# Patient Record
Sex: MALE | Race: WHITE | NOT HISPANIC OR LATINO | URBAN - METROPOLITAN AREA
[De-identification: names, ages, dates, MRNs, and addresses within clinical notes are randomized per-mention and may not be internally consistent; named-entity substitution may affect disease eponyms.]

---

## 2022-07-27 LAB — HBA1C MFR BLD HPLC: 5.3 %

## 2022-11-22 ENCOUNTER — OFFICE VISIT (OUTPATIENT)
Dept: INTERNAL MEDICINE CLINIC | Facility: CLINIC | Age: 37
End: 2022-11-22

## 2022-11-22 VITALS
DIASTOLIC BLOOD PRESSURE: 70 MMHG | HEART RATE: 74 BPM | SYSTOLIC BLOOD PRESSURE: 130 MMHG | BODY MASS INDEX: 23.87 KG/M2 | RESPIRATION RATE: 12 BRPM | WEIGHT: 192 LBS | HEIGHT: 75 IN | TEMPERATURE: 98.8 F

## 2022-11-22 DIAGNOSIS — G40.109 LOCALIZATION-RELATED EPILEPSY (HCC): ICD-10-CM

## 2022-11-22 DIAGNOSIS — R90.82 WHITE MATTER ABNORMALITY ON MRI OF BRAIN: ICD-10-CM

## 2022-11-22 DIAGNOSIS — G47.00 HYPOSOMNIA: Primary | ICD-10-CM

## 2022-11-22 DIAGNOSIS — F32.0 CURRENT MILD EPISODE OF MAJOR DEPRESSIVE DISORDER WITHOUT PRIOR EPISODE (HCC): ICD-10-CM

## 2022-11-22 DIAGNOSIS — R53.83 OTHER FATIGUE: ICD-10-CM

## 2022-11-22 DIAGNOSIS — Z11.59 NEED FOR HEPATITIS C SCREENING TEST: ICD-10-CM

## 2022-11-22 DIAGNOSIS — R17 ELEVATED BILIRUBIN: ICD-10-CM

## 2022-11-22 DIAGNOSIS — Z86.16 HISTORY OF COVID-19: ICD-10-CM

## 2022-11-22 DIAGNOSIS — A09 DIARRHEA OF INFECTIOUS ORIGIN: ICD-10-CM

## 2022-11-22 DIAGNOSIS — F43.10 POSTTRAUMATIC STRESS DISORDER: ICD-10-CM

## 2022-11-22 DIAGNOSIS — R74.01 ELEVATED TRANSAMINASE LEVEL: ICD-10-CM

## 2022-11-22 RX ORDER — LACOSAMIDE 150 MG/1
150 TABLET ORAL 2 TIMES DAILY
COMMUNITY
Start: 2022-10-23

## 2022-11-22 RX ORDER — LACOSAMIDE 100 MG/1
100 TABLET ORAL 2 TIMES DAILY
COMMUNITY

## 2022-11-22 NOTE — PROGRESS NOTES
Assessment/Plan:    No problem-specific Assessment & Plan notes found for this encounter  Diagnoses and all orders for this visit:    Hyposomnia  -     TSH, 3rd generation; Future  -     T4, free; Future  -     Home Study; Future  -     ECG 12 lead; Future    Posttraumatic stress disorder  -     Ambulatory Referral to Psychiatry; Future    Localization-related epilepsy [facility])  Comments:  - cont neurology care  -1/2022  +L temp spike eeg    History of COVID-19  Comments:  9/2021 + home test  10/2022 + home test  Orders:  -     ECG 12 lead; Future    Other fatigue    Need for hepatitis C screening test  -     Hepatitis C Antibody ([facility]); Future    Elevated transaminase level  -     Ceruloplasmin; Future  -     DEDRICK 12 Plus Profile, Do All ([facility]); Future  -     Ambulatory Referral to Gastroenterology; Future    Current mild episode of major depressive disorder without prior episode [facility])  Comments:  -jan 2022    Diarrhea of infectious origin  -     Ova and parasite examination; Future  -     Clostridium difficile toxin by PCR; Future  -     White Blood Cells, Stool by Gram Stain; Future  -     Ova and parasite examination  -     Clostridium difficile toxin by PCR    Elevated bilirubin  -     Antimitochondrial antibody; Future  -     Ambulatory Referral to Gastroenterology; Future    White matter abnormality on MRI of brain  -     Lyme Antibody Profile with reflex to WB; Future    Other orders  -     lacosamide (VIMPAT) 100 mg tablet; Take 100 mg by mouth 2 (two) times a day  -     Midazolam 5 MG/0 1ML SOLN; 5 mg into each nostril  -     lacosamide (VIMPAT) 150 mg tablet; Take 150 mg by mouth 2 (two) times a day          Subjective:      Patient ID: Vinayak Edwards is a 40 y o  male      Primary dr Abner Beckwith and gi referral  Neurology=dr hernandez at [facility]    2 inches old records reviewed  On vimpat  And cbd  For sz  1/2022--absence and generalizewd  + fatigue  Lft >400 5/2022- [place] [facility]=[place]  Ct==mild cerebeller tonsil ectopi  Mri=nonspecific white matter changes  + EEG L temp spike  Head trauma [place] 2015-Left head trauma  tbili 1 9  Hep c neg  First time antisz med=5/2022 keppra  Also lamictal rash  Moved out- threw wife during seizure  Hx purple hands  Diarrhea bad may 2022  Vertigo august  Amnesia once  Smell trigger auras  memmory bad  Hr 138  Walking apple watch at times  Long kamara           The following portions of the patient's history were reviewed and updated as appropriate: allergies, current medications, past family history, past medical history, past social history, past surgical history, and problem list     Review of Systems   Constitutional: Negative for activity change, appetite change, chills, diaphoresis, fatigue and fever  HENT: Negative for congestion, facial swelling, hearing loss, mouth sores, rhinorrhea, sore throat, trouble swallowing and voice change  Eyes: Negative for photophobia and pain  Respiratory: Negative for apnea, cough, chest tightness, shortness of breath and stridor  Cardiovascular: Negative for chest pain, palpitations and leg swelling  Gastrointestinal: Negative for abdominal distention, abdominal pain, blood in stool and constipation  Endocrine: Negative for cold intolerance and heat intolerance  Genitourinary: Negative for difficulty urinating, dysuria, flank pain, genital sores, hematuria and urgency  Musculoskeletal: Negative for arthralgias, back pain, gait problem, joint swelling and myalgias  Skin: Negative for rash and wound  Allergic/Immunologic: Negative for environmental allergies, food allergies and immunocompromised state  Neurological: Negative for dizziness, tremors, seizures, syncope, facial asymmetry, speech difficulty, weakness, light-headedness, numbness and headaches  Hematological: Negative for adenopathy  Does not bruise/bleed easily     Psychiatric/Behavioral: Negative for agitation, behavioral problems, hallucinations, self-injury, sleep disturbance and suicidal ideas  Objective:      /70   Pulse 74   Temp 98 8 °F (37 1 °C)   Resp 12   Ht 6' 3" (1 905 m)   Wt 87 1 kg (192 lb)   BMI 24 00 kg/m²          Physical Exam  Constitutional:       General: He is not in acute distress  Appearance: Normal appearance  He is not ill-appearing, toxic-appearing or diaphoretic  HENT:      Head: Normocephalic  Right Ear: Tympanic membrane and external ear normal       Left Ear: Tympanic membrane and external ear normal       Mouth/Throat:      Mouth: Mucous membranes are moist       Pharynx: No oropharyngeal exudate or posterior oropharyngeal erythema  Eyes:      General: No scleral icterus  Right eye: No discharge  Left eye: No discharge  Neck:      Vascular: No carotid bruit  Cardiovascular:      Rate and Rhythm: Normal rate and regular rhythm  Pulses: Normal pulses  Heart sounds: Normal heart sounds  No murmur heard  No friction rub  No gallop  Pulmonary:      Effort: No respiratory distress  Breath sounds: No stridor  No wheezing or rhonchi  Abdominal:      General: There is no distension  Palpations: Abdomen is soft  There is no mass  Tenderness: There is no abdominal tenderness  There is no right CVA tenderness, left CVA tenderness, guarding or rebound  Hernia: No hernia is present  Genitourinary:     Rectum: Guaiac result negative  Musculoskeletal:         General: No swelling, tenderness, deformity or signs of injury  Cervical back: No rigidity  No muscular tenderness  Right lower leg: No edema  Left lower leg: No edema  Lymphadenopathy:      Cervical: No cervical adenopathy  Skin:     Capillary Refill: Capillary refill takes less than 2 seconds  Coloration: Skin is not jaundiced  Findings: No bruising, erythema or lesion  Neurological:      Mental Status: He is alert        Cranial Nerves: No cranial nerve deficit  Sensory: No sensory deficit  Motor: No weakness        Coordination: Coordination normal       Gait: Gait normal       Deep Tendon Reflexes: Reflexes normal    Psychiatric:         Mood and Affect: Mood normal

## 2022-11-23 ENCOUNTER — TELEPHONE (OUTPATIENT)
Dept: PSYCHIATRY | Facility: CLINIC | Age: 37
End: 2022-11-23

## 2022-11-23 NOTE — TELEPHONE ENCOUNTER
Called patient regarding routine referral  LVM advising to return call to intake @ [phone] to be placed on wait list

## 2022-11-25 ENCOUNTER — OFFICE VISIT (OUTPATIENT)
Dept: GASTROENTEROLOGY | Facility: CLINIC | Age: 37
End: 2022-11-25

## 2022-11-25 VITALS
HEIGHT: 75 IN | DIASTOLIC BLOOD PRESSURE: 77 MMHG | HEART RATE: 75 BPM | WEIGHT: 191 LBS | SYSTOLIC BLOOD PRESSURE: 110 MMHG | BODY MASS INDEX: 23.75 KG/M2

## 2022-11-25 DIAGNOSIS — R74.01 ELEVATED TRANSAMINASE LEVEL: ICD-10-CM

## 2022-11-25 DIAGNOSIS — R19.7 DIARRHEA, UNSPECIFIED TYPE: Primary | ICD-10-CM

## 2022-11-25 DIAGNOSIS — Z86.69 HISTORY OF EPILEPSY: ICD-10-CM

## 2022-11-25 DIAGNOSIS — R17 ELEVATED BILIRUBIN: ICD-10-CM

## 2022-11-25 NOTE — TELEPHONE ENCOUNTER
Pt called regarding referral from PCP  I informed pt that he will need to be placed on our wait list  He would like to see someone sooner so I provided additional resources via e-mail  Pt stated he will call back to be placed on waiting list if other resources do not work out  Pt also stated he is currently living with his parents in [facility] due to epilepsy episode 6 months ago

## 2022-11-25 NOTE — PROGRESS NOTES
[address] [facility] - Outpatient Consultation  Todd Freeman 40 y o  male MRN: [id number]  Encounter: [id number]    ASSESSMENT AND PLAN:      1  Elevated transaminase level  2  Elevated bilirubin  Patient referred by PCP for evaluation of elevated liver enzymes  Patient states that after each of his episodes of epilepsy/seizure activity his liver enzymes have been elevated  With most recent lab work 11/1 patient's T bili 1 9  Consider elevation related to acute response versus cholestasis, autoimmune, Gilbert's syndrome    - Ambulatory Referral to Gastroenterology  - Gamma GT; Future  - US right upper quadrant; Future  - Gamma GT  - Bilirubin, direct; Future  - Bilirubin, direct  - PCP has also ordered AMA, DEDRICK, ceruloplasmin, hepatitis C antibody    3  History of epilepsy  Patient states approximately January of this year he started having seizure activity  States that he has had multiple grand mal seizures noted in May, August and October  Patient is awaiting appointment to see neurologist at [facility]  4  Diarrhea, unspecified type  Patient states that he notices that after each episode of a grand mal seizure he can have diarrhea for approximately 6-8 weeks  Patient states that he is eating much healthier this year and notes that flaxseed has helped with the diarrhea  Consider functional diarrhea versus inflammatory or infectious  Pending results of stool samples and blood work may consider further imaging or colonoscopy  - Stool Enteric Bacterial Panel by PCR  - patient's PCP has also ordered stool for C difficile and ova and parasites      Followup Appointment:  3 months  ______________________________________________________________________    Chief Complaint   Patient presents with   • Elevated transaminase level       HPI:   Todd Freeman is a 40y o  year old male referred by PCP for evaluation of elevated liver enzymes and diarrhea with past medical history of epilepsy and elevated transaminase  Patient states that he began having epileptic grandma seizures earlier this year  States he had approximately 3 episodes; May, August and October  States after episodes he has diarrhea for at least 6-8 weeks  He does state that taking flaxseed has helped  He denies hematochezia or melena  On exam he denies nausea, vomiting  States he does have some gaseous abdominal discomfort with pressure  Denies any acid reflux symptoms  Patient states he has changed his diet drastically over this past year and has noted some weight loss in doing so  States he is eating much healthier  Nonsmoker, states he has not had any alcohol in over 9 and half years due to alcoholism at that time  Patient states he does 300 mg of CBD every 8 hours in powder form  Patient states that since having these epilepticus episodes his liver enzymes have also been elevated  With most recent lab work 11/1 T bili noted at 1 9  Other liver enzymes normal, CBC essentially normal as well  Patient has not had a colonoscopy or EGD in the past   Patient does state he does have a history of COVID and is currently being evaluated through his PCP to see whether not he is a COVID long [place]  Patient states he is also waiting for an appointment with [facility] neuro  At this time patient does have ordered through his PCP AMA, DEDRICK, ceruloplasmin, hepatitis C antibody, stools for C diff and ova and parasite  Historical Information   History reviewed  No pertinent past medical history  History reviewed  No pertinent surgical history  Social History     Substance and Sexual Activity   Alcohol Use Not Currently     Social History     Substance and Sexual Activity   Drug Use Yes   • Frequency: 35 0 times per week   • Types: Marijuana     Social History     Tobacco Use   Smoking Status Former   • Types: Cigarettes   Smokeless Tobacco Former     History reviewed   No pertinent family history  Meds/Allergies     Current Outpatient Medications:   •  lacosamide (VIMPAT) 100 mg tablet  •  lacosamide (VIMPAT) 150 mg tablet  •  Midazolam 5 MG/0 1ML SOLN    Allergies   Allergen Reactions   • Oxcarbazepine Rash       PHYSICAL EXAM:    Blood pressure 110/77, pulse 75, height 6' 3" (1 905 m), weight 86 6 kg (191 lb)  Body mass index is 23 87 kg/m²  Normal exam    General Appearance: NAD, cooperative, alert  Eyes: Anicteric, PERRLA, EOMI  ENT:  Normocephalic, atraumatic, normal mucosa  Neck:  Supple, symmetrical, trachea midline,   Resp:  Clear to auscultation bilaterally; no rales, rhonchi or wheezing; respirations unlabored   CV:  S1 S2, Regular rate and rhythm; no murmur, rub, or gallop  GI:  Soft, non-tender, non-distended; normal bowel sounds; no masses, no organomegaly   Rectal: Deferred  Musculoskeletal: No cyanosis, clubbing or edema  Normal ROM  Skin:  No jaundice, rashes, or lesions   Heme/Lymph: No palpable cervical lymphadenopathy  Psych: Normal affect, good eye contact  Neuro: No gross deficits, AAOx3    Lab Results:   No results found for: WBC, HGB, HCT, MCV, PLT  No results found for: NA, K, CL, CO2, ANIONGAP, BUN, CREATININE, GLUCOSE, GLUF, CALCIUM, CORRECTEDCA, AST, ALT, ALKPHOS, PROT, BILITOT, EGFR  No results found for: IRON, TIBC, FERRITIN  No results found for: LIPASE    Radiology Results:   No results found  REVIEW OF SYSTEMS:    CONSTITUTIONAL: Denies any fever, chills, rigors, and weight loss  HEENT: No earache or tinnitus  Denies hearing loss or visual disturbances  CARDIOVASCULAR: No chest pain or palpitations  RESPIRATORY: Denies any cough, hemoptysis, shortness of breath or dyspnea on exertion  GASTROINTESTINAL: As noted in the History of Present Illness  GENITOURINARY: No problems with urination  Denies any hematuria or dysuria  NEUROLOGIC: No dizziness or vertigo, denies headaches  MUSCULOSKELETAL: Denies any muscle or joint pain     SKIN: Denies skin rashes or itching  ENDOCRINE: Denies excessive thirst  Denies intolerance to heat or cold  PSYCHOSOCIAL: Denies depression or anxiety  Denies any recent memory loss

## 2022-11-28 ENCOUNTER — OFFICE VISIT (OUTPATIENT)
Dept: LAB | Facility: HOSPITAL | Age: 37
End: 2022-11-28
Attending: INTERNAL MEDICINE

## 2022-11-28 ENCOUNTER — TELEPHONE (OUTPATIENT)
Dept: PSYCHIATRY | Facility: CLINIC | Age: 37
End: 2022-11-28

## 2022-11-28 DIAGNOSIS — G47.00 HYPOSOMNIA: ICD-10-CM

## 2022-11-28 DIAGNOSIS — Z86.16 HISTORY OF COVID-19: ICD-10-CM

## 2022-11-28 LAB — HCV AB SER-ACNC: <0.1

## 2022-11-29 ENCOUNTER — OFFICE VISIT (OUTPATIENT)
Dept: INTERNAL MEDICINE CLINIC | Facility: CLINIC | Age: 37
End: 2022-11-29

## 2022-11-29 ENCOUNTER — TELEPHONE (OUTPATIENT)
Dept: GASTROENTEROLOGY | Facility: CLINIC | Age: 37
End: 2022-11-29

## 2022-11-29 ENCOUNTER — TELEPHONE (OUTPATIENT)
Dept: INTERNAL MEDICINE CLINIC | Facility: CLINIC | Age: 37
End: 2022-11-29

## 2022-11-29 VITALS
WEIGHT: 191 LBS | RESPIRATION RATE: 12 BRPM | SYSTOLIC BLOOD PRESSURE: 140 MMHG | DIASTOLIC BLOOD PRESSURE: 80 MMHG | HEIGHT: 75 IN | OXYGEN SATURATION: 98 % | BODY MASS INDEX: 23.75 KG/M2 | TEMPERATURE: 97.7 F | HEART RATE: 74 BPM

## 2022-11-29 DIAGNOSIS — T78.3XXA ANGIOEDEMA, INITIAL ENCOUNTER: Primary | ICD-10-CM

## 2022-11-29 LAB
ATRIAL RATE: 67 BPM
ATRIAL RATE: 67 BPM
B BURGDOR IGG+IGM SER QL IA: NEGATIVE
BILIRUB DIRECT SERPL-MCNC: 0.22 MG/DL (ref 0–0.4)
CERULOPLASMIN SERPL-MCNC: 17.3 MG/DL (ref 16–31)
GGT SERPL-CCNC: 19 IU/L (ref 0–65)
HCV AB S/CO SERPL IA: <0.1 S/CO RATIO (ref 0–0.9)
P AXIS: 45 DEGREES
P AXIS: 45 DEGREES
PR INTERVAL: 180 MS
PR INTERVAL: 180 MS
QRS AXIS: 52 DEGREES
QRS AXIS: 52 DEGREES
QRSD INTERVAL: 58 MS
QRSD INTERVAL: 58 MS
QT INTERVAL: 370 MS
QT INTERVAL: 370 MS
QTC INTERVAL: 390 MS
QTC INTERVAL: 390 MS
T WAVE AXIS: 35 DEGREES
T WAVE AXIS: 35 DEGREES
T4 FREE SERPL-MCNC: 1.01 NG/DL (ref 0.82–1.77)
TSH SERPL DL<=0.005 MIU/L-ACNC: 0.92 UIU/ML (ref 0.45–4.5)
VENTRICULAR RATE: 67 BPM
VENTRICULAR RATE: 67 BPM

## 2022-11-29 RX ORDER — CETIRIZINE HYDROCHLORIDE 10 MG/1
10 TABLET ORAL DAILY
Qty: 3 TABLET | Refills: 0 | Status: SHIPPED | OUTPATIENT
Start: 2022-11-29 | End: 2022-12-02

## 2022-11-29 RX ORDER — METHYLPREDNISOLONE 4 MG/1
TABLET ORAL
Qty: 21 EACH | Refills: 0 | Status: SHIPPED | OUTPATIENT
Start: 2022-11-29

## 2022-11-29 NOTE — TELEPHONE ENCOUNTER
Spoke to the patient in regards to his direct bilirubin and GGT results that have, crossed the office  Both were negative  Still waiting for more autoimmune and hepatitis panel studies  Also patient states he will be taking his stool studies down tomorrow and noted that he is continuing to have diarrhea  Will communicate with patient as soon as other lab work and stool studies are completed and forwarded to the office

## 2022-11-29 NOTE — PROGRESS NOTES
Assessment/Plan:    No problem-specific Assessment & Plan notes found for this encounter  There are no diagnoses linked to this encounter  Subjective:      Patient ID: Audrey Martin is a 40 y o  male  At eye dr dialating eye routine  after drops lips swollen tingling numb swollen-estelle  Told sanjunathanael ngozi  Called back-better now  On vimpat  Had labs yesturday results pending  GI dr labs good=no reports yet for me--stool and us 12/4/22-np      The following portions of the patient's history were reviewed and updated as appropriate: allergies, current medications, past family history, past medical history, past social history, past surgical history, and problem list     Review of Systems   Constitutional: Negative for activity change and fatigue  HENT: Positive for facial swelling  Negative for ear discharge, ear pain, rhinorrhea and sore throat  Eyes: Negative for pain and visual disturbance  Respiratory: Negative for cough and shortness of breath  Cardiovascular: Negative for chest pain and leg swelling  Gastrointestinal: Negative for abdominal pain, constipation and diarrhea  Endocrine: Negative for cold intolerance and polyuria  Genitourinary: Negative for flank pain and hematuria  Musculoskeletal: Negative for back pain and joint swelling  Skin: Negative for pallor and wound  Neurological: Negative for dizziness, seizures and speech difficulty  Psychiatric/Behavioral: Negative for confusion and hallucinations  Objective: There were no vitals taken for this visit  Physical Exam  Constitutional:       General: He is not in acute distress  Appearance: Normal appearance  HENT:      Head: Normocephalic and atraumatic  Nose: Nose normal       Mouth/Throat:      Mouth: Mucous membranes are moist    Eyes:      Conjunctiva/sclera: Conjunctivae normal    Pulmonary:      Effort: Pulmonary effort is normal  No respiratory distress     Musculoskeletal: Cervical back: Neck supple  Skin:     General: Skin is warm and dry  Neurological:      General: No focal deficit present  Mental Status: He is alert and oriented to person, place, and time     Psychiatric:         Mood and Affect: Mood normal

## 2022-11-29 NOTE — TELEPHONE ENCOUNTER
Patient not having any symptoms anymore  Spoke to eye doctor and they did not feel he was having a reaction to his eye exam  Spoke with Dr Tala Titus and he said he should come to the office or go to the ER  Patient rather come to the office

## 2022-11-29 NOTE — TELEPHONE ENCOUNTER
Pt called and said his upper lip is numb , not swollen,steven,started about 20min ago   , he did go today to eye dr and they dilated eyes, doesn't know what to do

## 2022-12-01 LAB
C DIFF TOX GENS STL QL NAA+PROBE: NEGATIVE
O+P STL CONC: NORMAL

## 2022-12-02 ENCOUNTER — TELEPHONE (OUTPATIENT)
Dept: SLEEP CENTER | Facility: CLINIC | Age: 37
End: 2022-12-02

## 2022-12-02 LAB
ANA SER QL IF: NORMAL
ANTI-MITOCHONDRIAL M2 AB (RDL): NORMAL
C3 SERPL-MCNC: NORMAL MG/DL
C4 SERPL-MCNC: NORMAL MG/DL
CARDIOLIPIN IGA SER IA-ACNC: NORMAL
CARDIOLIPIN IGG SER IA-ACNC: NORMAL
CARDIOLIPIN IGM SER IA-ACNC: NORMAL
CENTROMERE AB TITR SER IF: NORMAL {TITER}
DSDNA AB SER FARR-ACNC: NORMAL [IU]/ML
ENA SCL70 AB SER IA-ACNC: <20 UNITS
ENA SM AB SER-ACNC: <20 UNITS
ENA SS-A AB SER IA-ACNC: <20 UNITS
ENA SS-B AB SER IA-ACNC: <20 UNITS
THYROPEROXIDASE AB SERPL-ACNC: <9 IU/ML
U1 SNRNP AB SER IA-ACNC: <20 UNITS

## 2022-12-02 NOTE — TELEPHONE ENCOUNTER
----- Message from Daniel Ba MD sent at 12/2/2022  1:22 PM EST -----  Approved    ----- Message -----  From: Deni Blair  Sent: 11/29/2022   8:37 AM EST  To: Sleep Medicine [facility] Provider    This Home study needs approval      If approved please sign and return to clerical pool  If denied please include reasons why  Also provide alternative testing if warranted  Please sign and return to clerical pool

## 2022-12-03 LAB
ADV 40+41 DNA STL QL NAA+NON-PROBE: NOT DETECTED
ASTRO TYP 1-8 RNA STL QL NAA+NON-PROBE: NOT DETECTED
C CAYETANENSIS DNA STL QL NAA+NON-PROBE: NOT DETECTED
C COLI+JEJ+UPSA DNA STL QL NAA+NON-PROBE: NOT DETECTED
C DIF TOX TCDA+TCDB STL QL NAA+NON-PROBE: NOT DETECTED
CRYPTOSP DNA STL QL NAA+NON-PROBE: NOT DETECTED
E COLI O157 DNA STL QL NAA+NON-PROBE: NORMAL
E HISTOLYT DNA STL QL NAA+NON-PROBE: NOT DETECTED
EAEC PAA PLAS AGGR+AATA ST NAA+NON-PRB: NOT DETECTED
EC STX1+STX2 GENES STL QL NAA+NON-PROBE: NOT DETECTED
EPEC EAE GENE STL QL NAA+NON-PROBE: NOT DETECTED
ETEC LTA+ST1A+ST1B TOX ST NAA+NON-PROBE: NOT DETECTED
G LAMBLIA DNA STL QL NAA+NON-PROBE: NOT DETECTED
NOROVIRUS GI+II RNA STL QL NAA+NON-PROBE: NOT DETECTED
P SHIGELLOIDES DNA STL QL NAA+NON-PROBE: NOT DETECTED
RVA RNA STL QL NAA+NON-PROBE: NOT DETECTED
S ENT+BONG DNA STL QL NAA+NON-PROBE: NOT DETECTED
SAPO I+II+IV+V RNA STL QL NAA+NON-PROBE: NOT DETECTED
SHIGELLA SP+EIEC IPAH ST NAA+NON-PROBE: NOT DETECTED
V CHOL+PARA+VUL DNA STL QL NAA+NON-PROBE: NOT DETECTED
V CHOLERAE DNA STL QL NAA+NON-PROBE: NOT DETECTED
Y ENTEROCOL DNA STL QL NAA+NON-PROBE: NOT DETECTED

## 2022-12-05 ENCOUNTER — TELEMEDICINE (OUTPATIENT)
Dept: INTERNAL MEDICINE CLINIC | Facility: CLINIC | Age: 37
End: 2022-12-05

## 2022-12-05 DIAGNOSIS — R56.9 POST-ICTAL STATE (HCC): ICD-10-CM

## 2022-12-05 DIAGNOSIS — R42 VERTIGO: ICD-10-CM

## 2022-12-05 DIAGNOSIS — Z20.822 EXPOSURE TO COVID-19 VIRUS: Primary | ICD-10-CM

## 2022-12-05 DIAGNOSIS — G40.109 LOCALIZATION-RELATED EPILEPSY (HCC): ICD-10-CM

## 2022-12-05 LAB
ANA SER QL IF: POSITIVE
ANA SPECKLED TITR SER: ABNORMAL {TITER}
ANTI-MITOCHONDRIAL M2 AB (RDL): <20 UNITS
C3 SERPL-MCNC: 157 MG/DL (ref 82–167)
C4 SERPL-MCNC: 24 MG/DL (ref 14–44)
CARDIOLIPIN IGA SER IA-ACNC: <12 APL U/ML
CARDIOLIPIN IGG SER IA-ACNC: <15 GPL U/ML
CARDIOLIPIN IGM SER IA-ACNC: <13 MPL U/ML
CENTROMERE AB TITR SER IF: ABNORMAL {TITER}
DSDNA AB SER FARR-ACNC: <8 IU/ML
ENA SCL70 AB SER IA-ACNC: <20 UNITS
ENA SM AB SER-ACNC: <20 UNITS
ENA SS-A AB SER IA-ACNC: <20 UNITS
ENA SS-B AB SER IA-ACNC: <20 UNITS
NOTE: ABNORMAL
THYROPEROXIDASE AB SERPL-ACNC: <9 IU/ML
U1 SNRNP AB SER IA-ACNC: <20 UNITS

## 2022-12-05 NOTE — PROGRESS NOTES
Virtual Regular Visit    Verification of patient location:    Patient is located in the following state in which I hold an active license [place]      Assessment/Plan:    Problem List Items Addressed This Visit    None           Reason for visit is   Chief Complaint   Patient presents with   • Virtual Regular Visit        Encounter provider Ruben Boyce DO    Provider located at [address]  [phone]      Recent Visits  Date Type Provider Dept   11/29/22 Office Visit Ruben Boyce, [facility]   11/29/22 Telephone [id number] [facility]   Showing recent visits within past 7 days and meeting all other requirements  Future Appointments  No visits were found meeting these conditions  Showing future appointments within next 150 days and meeting all other requirements       The patient was identified by name and date of birth  Leslyabraham Farley was informed that this is a telemedicine visit and that the visit is being conducted through the [address] Now platform  He agrees to proceed     My office door was closed  No one else was in the room  He acknowledged consent and understanding of privacy and security of the video platform  The patient has agreed to participate and understands they can discontinue the visit at any time  Patient is aware this is a billable service  Josue Hodge is a 40 y o  male     mom test pos covid 12/2, lives with mom, 12/3 patient had 30 min episode did not know who he was, nothing seemed real, did covid test 12/3 and 12/4, he is worried this has something to do with his lip going numb last week, has a call into neurologist, is doing another covid test prior to virtual visit, text [phone]  Agree with [facility]  ? Post ictal       No past medical history on file  No past surgical history on file      Current Outpatient Medications   Medication Sig Dispense Refill   • cetirizine (ZyrTEC) 10 mg tablet Take 1 tablet (10 mg total) by mouth daily for 3 days 3 tablet 0   • lacosamide (VIMPAT) 100 mg tablet Take 100 mg by mouth 2 (two) times a day     • lacosamide (VIMPAT) 150 mg tablet Take 150 mg by mouth 2 (two) times a day     • methylPREDNISolone 4 MG tablet therapy pack Use as directed on package 21 each 0   • Midazolam 5 MG/0 1ML SOLN 5 mg into each nostril       No current facility-administered medications for this visit  Allergies   Allergen Reactions   • Tropicamide Angioedema   • Oxcarbazepine Rash       Review of Systems   Constitutional: Negative for activity change and fatigue  HENT: Negative for ear discharge, ear pain, rhinorrhea and sore throat  Eyes: Negative for pain and visual disturbance  Respiratory: Negative for cough and shortness of breath  Cardiovascular: Negative for chest pain and leg swelling  Gastrointestinal: Negative for abdominal pain, constipation and diarrhea  Endocrine: Negative for cold intolerance and polyuria  Genitourinary: Negative for flank pain and hematuria  Musculoskeletal: Negative for back pain and joint swelling  Skin: Negative for pallor and wound  Neurological: Negative for dizziness, seizures and speech difficulty  Psychiatric/Behavioral: Negative for confusion and hallucinations  Video Exam    There were no vitals filed for this visit  Physical Exam  Constitutional:       General: He is not in acute distress  Appearance: Normal appearance  HENT:      Head: Normocephalic and atraumatic  Nose: Nose normal       Mouth/Throat:      Mouth: Mucous membranes are moist    Eyes:      Conjunctiva/sclera: Conjunctivae normal    Pulmonary:      Effort: Pulmonary effort is normal  No respiratory distress  Musculoskeletal:      Cervical back: Neck supple  Skin:     General: Skin is warm and dry  Neurological:      General: No focal deficit present        Mental Status: He is alert and oriented to person, place, and time     Psychiatric:         Mood and Affect: Mood normal           I spent 10 minutes directly with the patient during this visit

## 2022-12-06 LAB
Lab: NORMAL
O+P STL CONC: NORMAL

## 2022-12-13 ENCOUNTER — OFFICE VISIT (OUTPATIENT)
Dept: INTERNAL MEDICINE CLINIC | Facility: CLINIC | Age: 37
End: 2022-12-13

## 2022-12-13 VITALS
DIASTOLIC BLOOD PRESSURE: 70 MMHG | HEART RATE: 74 BPM | WEIGHT: 190 LBS | SYSTOLIC BLOOD PRESSURE: 120 MMHG | HEIGHT: 75 IN | BODY MASS INDEX: 23.62 KG/M2 | TEMPERATURE: 97.8 F

## 2022-12-13 DIAGNOSIS — R00.2 PALPITATIONS: ICD-10-CM

## 2022-12-13 DIAGNOSIS — G56.21 ULNAR NEUROPATHY AT WRIST, RIGHT: Primary | ICD-10-CM

## 2022-12-13 DIAGNOSIS — G40.109 LOCALIZATION-RELATED EPILEPSY (HCC): ICD-10-CM

## 2022-12-15 ENCOUNTER — HOSPITAL ENCOUNTER (OUTPATIENT)
Dept: ULTRASOUND IMAGING | Facility: HOSPITAL | Age: 37
End: 2022-12-15

## 2022-12-15 DIAGNOSIS — R74.01 ELEVATED TRANSAMINASE LEVEL: ICD-10-CM

## 2022-12-22 ENCOUNTER — HOSPITAL ENCOUNTER (OUTPATIENT)
Dept: SLEEP CENTER | Facility: HOSPITAL | Age: 37
Discharge: HOME | End: 2022-12-22
Attending: INTERNAL MEDICINE

## 2022-12-22 DIAGNOSIS — R17 ELEVATED BILIRUBIN: Primary | ICD-10-CM

## 2022-12-22 DIAGNOSIS — G47.00 HYPOSOMNIA: ICD-10-CM

## 2022-12-24 NOTE — PROGRESS NOTES
Home Sleep Study Documentation    HOME STUDY DEVICE: Noxturnal no                                           Stacey G3 yes      Pre-Sleep Home Study:    Set-up and instructions performed by: Wallace Friday performed demonstration for Patient: yes    Return demonstration performed by Patient: yes    Written instructions provided to Patient: yes    Patient signed consent form: yes        Post-Sleep Home Study:    Additional comments by Patient: none    Home Sleep Study Failed:no:    Failure reason: N/A    Reported or Detected: N/A    Scored by:  CINTIA Mcfarland

## 2022-12-28 ENCOUNTER — HOSPITAL ENCOUNTER (OUTPATIENT)
Dept: NON INVASIVE DIAGNOSTICS | Age: 37
Discharge: HOME | End: 2022-12-28

## 2022-12-28 DIAGNOSIS — R00.2 PALPITATIONS: ICD-10-CM

## 2023-01-09 ENCOUNTER — TELEPHONE (OUTPATIENT)
Dept: SLEEP CENTER | Facility: CLINIC | Age: 38
End: 2023-01-09

## 2023-01-09 NOTE — TELEPHONE ENCOUNTER
Left message for patient advising sleep study resulted and does not show ANJANA  Per study order, PCP Dr Pete Posey to provide follow up  Phone number provided in message - [phone]

## 2023-01-21 PROBLEM — A09 DIARRHEA OF INFECTIOUS ORIGIN: Status: RESOLVED | Noted: 2022-11-22 | Resolved: 2023-01-21

## 2023-01-21 PROBLEM — Z11.59 NEED FOR HEPATITIS C SCREENING TEST: Status: RESOLVED | Noted: 2022-11-22 | Resolved: 2023-01-21

## 2023-02-08 ENCOUNTER — TELEPHONE (OUTPATIENT)
Dept: PSYCHIATRY | Facility: CLINIC | Age: 38
End: 2023-02-08

## 2023-02-08 NOTE — TELEPHONE ENCOUNTER
Patient's father called to inquire about [facility] therapy and psych services  Writer advised the caller that there is a waitlist and added the patient to the list  Also advised the patient to call insurance company for additional resources

## 2023-02-28 ENCOUNTER — OFFICE VISIT (OUTPATIENT)
Dept: GASTROENTEROLOGY | Facility: CLINIC | Age: 38
End: 2023-02-28

## 2023-02-28 ENCOUNTER — OFFICE VISIT (OUTPATIENT)
Dept: INTERNAL MEDICINE CLINIC | Facility: CLINIC | Age: 38
End: 2023-02-28

## 2023-02-28 VITALS
SYSTOLIC BLOOD PRESSURE: 120 MMHG | WEIGHT: 194 LBS | BODY MASS INDEX: 24.12 KG/M2 | HEIGHT: 75 IN | DIASTOLIC BLOOD PRESSURE: 90 MMHG

## 2023-02-28 VITALS
BODY MASS INDEX: 24.25 KG/M2 | WEIGHT: 195 LBS | DIASTOLIC BLOOD PRESSURE: 70 MMHG | HEIGHT: 75 IN | SYSTOLIC BLOOD PRESSURE: 120 MMHG | HEART RATE: 75 BPM

## 2023-02-28 DIAGNOSIS — G40.109 LOCALIZATION-RELATED EPILEPSY (HCC): ICD-10-CM

## 2023-02-28 DIAGNOSIS — R56.9 POST-ICTAL STATE (HCC): ICD-10-CM

## 2023-02-28 DIAGNOSIS — Z86.16 HISTORY OF COVID-19: Primary | ICD-10-CM

## 2023-02-28 DIAGNOSIS — E44.1 MILD PROTEIN-CALORIE MALNUTRITION (HCC): ICD-10-CM

## 2023-02-28 DIAGNOSIS — F41.9 ANXIETY: ICD-10-CM

## 2023-02-28 DIAGNOSIS — R17 SERUM TOTAL BILIRUBIN ELEVATED: ICD-10-CM

## 2023-02-28 DIAGNOSIS — Z86.69 HISTORY OF EPILEPSY: ICD-10-CM

## 2023-02-28 DIAGNOSIS — F43.10 POSTTRAUMATIC STRESS DISORDER: ICD-10-CM

## 2023-02-28 DIAGNOSIS — R79.89 ELEVATED LFTS: ICD-10-CM

## 2023-02-28 DIAGNOSIS — R68.89 FUGUE: ICD-10-CM

## 2023-02-28 DIAGNOSIS — R19.7 DIARRHEA, UNSPECIFIED TYPE: Primary | ICD-10-CM

## 2023-02-28 DIAGNOSIS — R00.2 PALPITATIONS: ICD-10-CM

## 2023-02-28 PROBLEM — G56.21 ULNAR NEUROPATHY AT WRIST, RIGHT: Status: RESOLVED | Noted: 2022-12-13 | Resolved: 2023-02-28

## 2023-02-28 RX ORDER — MULTIVIT WITH MINERALS/LUTEIN
2000 TABLET ORAL DAILY
COMMUNITY

## 2023-02-28 RX ORDER — LACOSAMIDE 200 MG/1
200 TABLET ORAL EVERY 12 HOURS SCHEDULED
COMMUNITY

## 2023-02-28 NOTE — PROGRESS NOTES
[facility] - Outpatient Follow-up Note  Hoa Villalobos 40 y o  male MRN: [id number]  Encounter: [id number]    ASSESSMENT AND PLAN:      1  Diarrhea, unspecified type  Patient states since his last office visit his bowel movements have much improved with dietary discretion  Stool studies were negative and previous lab work for celiac's was negative as well     Consider functional diarrhea, IBS versus inflammatory or infectious  2  Elevated LFTs  3  Serum total bilirubin elevated  Since last office visit liver enzymes normalized with the exception of T  bili 1 9  GGT and D bili were normal   AMA 1: 160, DEDRICK screening positive  Patient does have recent history of COVID and currently having long COVID symptoms  Consider [place] syndrome, residual  from COVID, medication  Follow-up previous lab work before ordering more extensive work-up  - Comprehensive metabolic panel  - DEDRICK w/Reflex  - Antimitochondrial antibody; Future  - Antimitochondrial antibody    4  History of epilepsy  Patient states approximately January of last year he started having seizure activity  States that he has had multiple grand mal seizures over the last year  States his last seizure was October 2022  Patient is followed by [facility] neurology  Followup Appointment: Months  ______________________________________________________________________    Chief Complaint   Patient presents with   • bloating, cramping, diarrhea three times a day, lost 20 gavin     Oct - Dec lost the weight, malnurished  Started vitamin c and cbd, feels better, diarrhea only once a week, still has some gas pains, getting tightness in chest, not sure if heartburn     HPI: 69-year-old male with past medical history of elevated liver enzymes, diarrhea and epilepsy presented for 3-month follow-up  Since last office visit, patient had liver enzymes updated  T  bili 1 9 rest of liver enzymes normalized    GGT and D bili normal   AMA 1: 160, DEDRICK was positive  Patient states he had COVID and [facility] to [facility]  Stool studies for C  difficile ova and parasites and culture were negative  Right upper quadrant ultrasound normal   On exam patient denies nausea, vomiting or abdominal pain  States he is having normal bowel movements with loose bowels approximately every 10 days or so  Denies hematochezia or melena  Non-smoker, denies EtOH use  Denies history of colon cancer  No EGD or colonoscopy to date  Historical Information   History reviewed  No pertinent past medical history  History reviewed  No pertinent surgical history  Social History     Substance and Sexual Activity   Alcohol Use Not Currently     Social History     Substance and Sexual Activity   Drug Use Yes   • Frequency: 35 0 times per week   • Types: Marijuana     Social History     Tobacco Use   Smoking Status Former   • Types: Cigarettes   Smokeless Tobacco Former     Family History   Problem Relation Age of Onset   • Heart disease Mother    • Diabetes Father    • Colon cancer Neg Hx    • Colon polyps Neg Hx          Current Outpatient Medications:   •  Fexofenadine HCl (ALLEGRA ALLERGY PO)  •  lacosamide (VIMPAT) 150 mg tablet  •  Midazolam 5 MG/0 1ML SOLN  •  cetirizine (ZyrTEC) 10 mg tablet  •  methylPREDNISolone 4 MG tablet therapy pack  Allergies   Allergen Reactions   • Tropicamide Angioedema   • Oxcarbazepine Rash     Reviewed medications and allergies and updated as indicated    PHYSICAL EXAM:    Blood pressure 120/90, height 6' 3" (1 905 m), weight 88 kg (194 lb)  Body mass index is 24 25 kg/m²  Normal exam    General Appearance: NAD, cooperative, alert  Eyes: Anicteric, PERRLA, EOMI  ENT:  Normocephalic, atraumatic, normal mucosa      Neck:  Supple, symmetrical, trachea midline  Resp:  Clear to auscultation bilaterally; no rales, rhonchi or wheezing; respirations unlabored   CV:  S1 S2, Regular rate and rhythm; no murmur, rub, or gallop  GI:  Soft, non-tender, non-distended; normal bowel sounds; no masses, no organomegaly   Rectal: Deferred  Musculoskeletal: No cyanosis, clubbing or edema  Normal ROM  Skin:  No jaundice, rashes, or lesions   Heme/Lymph: No palpable cervical lymphadenopathy  Psych: Normal affect, good eye contact  Neuro: No gross deficits, AAOx3    Lab Results:   No results found for: WBC, HGB, HCT, MCV, PLT  No results found for: NA, K, CL, CO2, ANIONGAP, BUN, CREATININE, GLUCOSE, GLUF, CALCIUM, CORRECTEDCA, AST, ALT, ALKPHOS, PROT, BILITOT, EGFR  No results found for: IRON, TIBC, FERRITIN  No results found for: LIPASE    Radiology Results:   No results found

## 2023-02-28 NOTE — PROGRESS NOTES
Assessment/Plan:    No problem-specific Assessment & Plan notes found for this encounter  Diagnoses and all orders for this visit:    History of COVID-19    Localization-related epilepsy ([address] )    Posttraumatic stress disorder  Comments:  - dissociativre==f/u neuro and psychiatry    Post-ictal state [facility])    Palpitations  -     Ambulatory Referral to Cardiology; Future    Anxiety    Fugue  Comments:  -f/u psych  Orders:  -     Ambulatory Referral to Psychiatry; Future    Mild protein-calorie malnutrition (HCC)  -     Magnesium; Future  -     Vitamin B12; Future  -     Vitamin B1, whole blood; Future  -     Vitamin D 25 hydroxy; Future  -     Comprehensive metabolic panel; Future  -     Magnesium  -     Vitamin B12  -     Vitamin B1, whole blood  -     Vitamin D 25 hydroxy  -     Comprehensive metabolic panel    Other orders  -     lacosamide (VIMPAT) 200 mg tablet; Take 200 mg by mouth every 12 (twelve) hours  -     Misc Natural Products (T-RELIEF CBD+13 SL); Place under the tongue  -     Ascorbic Acid (vitamin C) 1000 MG tablet; Take 2,000 mg by mouth daily          Subjective:      Patient ID: Sherri Shukla is a 40 y o  male  Since last OV 12/2022:  -neurologist 12/2022-has f/u appt 3/7/2023  -emg=wnl  -holter=rare ectopy no arrythmia  -sleep study= no ANJANA  -GI dr Disha [facility]ep[facility] bili=diarrhea= OV today reviewed    TODAY 2/28/2023:  -cbd hemp  -doing better  -tight palp 5 days ago 148/89  -apple watch ok  -onwaittlist psychiatry  -mri tomorrrow      The following portions of the patient's history were reviewed and updated as appropriate: allergies, current medications, past family history, past medical history, past social history, past surgical history, and problem list     Review of Systems   Constitutional: Negative for activity change and fatigue  HENT: Negative for ear discharge, ear pain, rhinorrhea and sore throat  Eyes: Negative for pain and visual disturbance     Respiratory: Negative for cough and shortness of breath  Cardiovascular: Negative for chest pain and leg swelling  Gastrointestinal: Negative for abdominal pain, constipation and diarrhea  Endocrine: Negative for cold intolerance and polyuria  Genitourinary: Negative for flank pain and hematuria  Musculoskeletal: Negative for back pain and joint swelling  Skin: Negative for pallor and wound  Neurological: Negative for dizziness, seizures and speech difficulty  Psychiatric/Behavioral: Negative for confusion and hallucinations  Objective:      /70   Pulse 75   Ht 6' 3" (1 905 m)   Wt 88 5 kg (195 lb)   BMI 24 37 kg/m²          Physical Exam  Vitals and nursing note reviewed  Constitutional:       General: He is not in acute distress  Appearance: Normal appearance  He is not ill-appearing  HENT:      Head: Normocephalic  Right Ear: External ear normal  There is no impacted cerumen  Left Ear: External ear normal  There is no impacted cerumen  Nose: No congestion or rhinorrhea  Mouth/Throat:      Pharynx: No posterior oropharyngeal erythema  Eyes:      General: No scleral icterus  Right eye: No discharge  Left eye: No discharge  Neck:      Vascular: No carotid bruit  Cardiovascular:      Rate and Rhythm: Normal rate and regular rhythm  Heart sounds: Normal heart sounds  No murmur heard  No friction rub  No gallop  Pulmonary:      Breath sounds: No wheezing or rhonchi  Abdominal:      General: There is no distension  Tenderness: There is no abdominal tenderness  There is no guarding  Musculoskeletal:         General: No swelling  Cervical back: No rigidity  Right lower leg: No edema  Left lower leg: No edema  Lymphadenopathy:      Cervical: No cervical adenopathy  Skin:     Coloration: Skin is not jaundiced  Neurological:      Mental Status: He is alert  Cranial Nerves: No cranial nerve deficit        Motor: No weakness        Coordination: Coordination normal    Psychiatric:         Mood and Affect: Mood normal

## 2023-03-17 ENCOUNTER — TELEPHONE (OUTPATIENT)
Dept: PSYCHIATRY | Facility: CLINIC | Age: 38
End: 2023-03-17

## 2023-03-17 LAB
25(OH)D3+25(OH)D2 SERPL-MCNC: 61 NG/ML (ref 30–100)
ALBUMIN SERPL-MCNC: 4.5 G/DL (ref 4–5)
ALBUMIN/GLOB SERPL: 1.7 {RATIO} (ref 1.2–2.2)
ALP SERPL-CCNC: 68 IU/L (ref 44–121)
ALT SERPL-CCNC: 16 IU/L (ref 0–44)
AST SERPL-CCNC: 14 IU/L (ref 0–40)
BILIRUB SERPL-MCNC: 1 MG/DL (ref 0–1.2)
BUN SERPL-MCNC: 9 MG/DL (ref 6–20)
BUN/CREAT SERPL: 10 (ref 9–20)
CALCIUM SERPL-MCNC: 9.6 MG/DL (ref 8.7–10.2)
CHLORIDE SERPL-SCNC: 103 MMOL/L (ref 96–106)
CO2 SERPL-SCNC: 26 MMOL/L (ref 20–29)
CREAT SERPL-MCNC: 0.9 MG/DL (ref 0.76–1.27)
EGFR: 114 ML/MIN/1.73
GLOBULIN SER-MCNC: 2.6 G/DL (ref 1.5–4.5)
GLUCOSE SERPL-MCNC: 80 MG/DL (ref 70–99)
MAGNESIUM SERPL-MCNC: 2.1 MG/DL (ref 1.6–2.3)
POTASSIUM SERPL-SCNC: 4.4 MMOL/L (ref 3.5–5.2)
PROT SERPL-MCNC: 7.1 G/DL (ref 6–8.5)
SODIUM SERPL-SCNC: 141 MMOL/L (ref 134–144)
VIT B1 BLD-SCNC: NORMAL NMOL/L
VIT B12 SERPL-MCNC: 701 PG/ML (ref 232–1245)

## 2023-03-17 NOTE — TELEPHONE ENCOUNTER
Sent Wait List Letter VIA [employer]   Verify patients need of services from wait list after 15 days   If no communication remove from Medication Management  wait list

## 2023-03-20 ENCOUNTER — TELEPHONE (OUTPATIENT)
Dept: PSYCHIATRY | Facility: CLINIC | Age: 38
End: 2023-03-20

## 2023-03-21 LAB — VIT B1 BLD-SCNC: 126.5 NMOL/L (ref 66.5–200)

## 2023-05-09 ENCOUNTER — CONSULT (OUTPATIENT)
Dept: CARDIOLOGY CLINIC | Facility: CLINIC | Age: 38
End: 2023-05-09

## 2023-05-09 VITALS
WEIGHT: 200.6 LBS | BODY MASS INDEX: 24.94 KG/M2 | SYSTOLIC BLOOD PRESSURE: 118 MMHG | HEIGHT: 75 IN | HEART RATE: 61 BPM | DIASTOLIC BLOOD PRESSURE: 68 MMHG

## 2023-05-09 DIAGNOSIS — R00.2 PALPITATIONS: Primary | ICD-10-CM

## 2023-05-09 DIAGNOSIS — I95.1 ORTHOSTATIC HYPOTENSION: ICD-10-CM

## 2023-05-09 NOTE — PROGRESS NOTES
Cardiology Consultation     Brando Burch  [id number]  1985  [facility] [address] [address] [phone]    1  Palpitations  Ambulatory Referral to Cardiology    Echo complete w/ contrast if indicated    AMB extended holter monitor      2  Orthostatic hypotension  Echo complete w/ contrast if indicated    AMB extended holter monitor            Discussion/Summary:    With changing his lifestyle which included eating better, keeping well-hydrated and cutting back caffeine a lot have Ron's symptoms have improved  He no longer has what appeared to be orthostatic hypotension, although it was never quite clear if seizures had anything to do with this as these events occurred before the diagnosis of his seizures  None of the symptoms have been been present recently  He still continues to have intermittent palpitations and random chest tightness, but can exert himself without issues  For completeness we will have him wear a 2-week extended ambulatory Holter and have him undergo an echocardiogram   We will call him with the results  If these are unremarkable he only needs to follow with us as needed  He will continue to live a healthy lifestyle  HPI:    Mr Jovanna Arceo comes in for a consultation to discuss symptoms of palpitations, chest tightness and what appears to be a transient time period of orthostatic hypotension that occurred last year  Bogdan Larose started getting a lot of the symptoms after patrick COVID-19 in September 2021  Also about a year ago he developed a seizure disorder that involved both partial seizures and a grand mal seizure  He is following closely with neurology  His only risk factor for cardiovascular disease appears to be a family history of an MI in his mother at the age of 62  Bogdan Larose started experiencing intermittent palpitations ever since patrick COVID-19, particularly over the last year  Usually they occur just a few times per month but will linger for a better part of the day  In January 2022 he started experiencing syncope with standing up too quickly  This was prior to being diagnosed with a seizure disorder  Although it sounds like orthostatic hypotension he never truly had a work-up for these events and it is hard to tell if seizure activity was related to this or not  He changed his lifestyle which included cutting out caffeine and living and overall healthier lifestyle, along with keeping better hydrated  The symptoms of orthostatic hypotension and syncope subsided and have not returned  He was still getting the symptoms of palpitations intermittently and he wore a Holter monitor for his PCP which was normal   These are now occurring about once or twice a month  He has not had any lightheadedness, dizziness or any recent syncope  During his most recent set of palpitations he did have some residual chest tightness on the right side of his chest   However he has no symptoms suggestive of angina or exertional chest pain  In fact he exercises on a regular basis and can do this for upwards of an hour, without any limitations or symptoms  No chest pain or shortness of breath  He does not have any exertional palpitations or any exertional lightheadedness/syncope  He tells me that he quit drinking about 10 years ago  There is no other substance abuse and as stated he cut out caffeine        Patient Active Problem List   Diagnosis   • Localization-related epilepsy [facility])   • Posttraumatic stress disorder   • Hyposomnia   • History of COVID-19   • Elevated transaminase level   • Other fatigue   • Current mild episode of major depressive disorder without prior episode ([facility] )   • Exposure to COVID-19 virus   • Vertigo   • Post-ictal state [facility])   • Palpitations   • Recurrent major depressive episodes, moderate (HCC)   • Anxiety   • Fugue   • Mild protein-calorie malnutrition ([facility] ) History reviewed  No pertinent past medical history  Social History     Socioeconomic History   • Marital status: Unknown     Spouse name: Not on file   • Number of children: Not on file   • Years of education: Not on file   • Highest education level: Not on file   Occupational History   • Not on file   Tobacco Use   • Smoking status: Former     Types: Cigarettes   • Smokeless tobacco: Former   Vaping Use   • Vaping Use: Never used   Substance and Sexual Activity   • Alcohol use: Not Currently   • Drug use: Yes     Frequency: 35 0 times per week     Types: Marijuana   • Sexual activity: Not on file   Other Topics Concern   • Not on file   Social History Narrative   • Not on file     Social Determinants of Health     Financial Resource Strain: Not on file   Food Insecurity: Not on file   Transportation Needs: Not on file   Physical Activity: Not on file   Stress: Not on file   Social Connections: Not on file   Intimate Partner Violence: Not on file   Housing Stability: Not on file      Family History   Problem Relation Age of Onset   • Heart disease Mother    • Diabetes Father    • Colon cancer Neg Hx    • Colon polyps Neg Hx      History reviewed  No pertinent surgical history      Current Outpatient Medications:   •  Ascorbic Acid (vitamin C) 1000 MG tablet, Take 2,000 mg by mouth daily, Disp: , Rfl:   •  Fexofenadine HCl (ALLEGRA ALLERGY PO), Take by mouth, Disp: , Rfl:   •  lacosamide (VIMPAT) 200 mg tablet, Take 200 mg by mouth every 12 (twelve) hours Pt reports this can be up to 250 mg daily, Disp: , Rfl:   •  Midazolam 5 MG/0 1ML SOLN, 5 mg into each nostril, Disp: , Rfl:   •  Misc Natural Products (T-RELIEF CBD+13 SL), Place under the tongue, Disp: , Rfl:   Allergies   Allergen Reactions   • Tropicamide Angioedema   • Oxcarbazepine Rash     Vitals:    05/09/23 1448   BP: 118/68   BP Location: Left arm   Patient Position: Sitting   Cuff Size: Standard   Pulse: 61   Weight: 91 kg (200 lb 9 6 oz)   Height: "6' 3\" (1 905 m)       Labs:  Lab Results   Component Value Date    K 4 4 03/16/2023     03/16/2023    CO2 26 03/16/2023    BUN 9 03/16/2023    CREATININE 0 90 03/16/2023       Imaging: ECGs in the past have demonstrated sinus rhythm and were within normal limits  HOLTER (12/28/2022):  48-hour Holter monitor revealed underlying rhythm to be normal sinus rhythm, average of 72 beats a minute  Minimum 42, sinus bradycardia, maximum 135, sinus tachycardia  There was no ventricular ectopic activity and 35 PACs noted  No complex supraventricular events were noted, no bradyarrhythmias were noted     #1  Resting normal sinus rhythm  #2  Minimal ectopy  #3  No symptoms reported      Review of Systems:  Review of Systems   Constitutional: Positive for fatigue  HENT: Negative  Eyes: Negative  Respiratory: Positive for chest tightness  Cardiovascular: Positive for palpitations  Gastrointestinal: Negative  Musculoskeletal: Negative  Skin: Negative  Allergic/Immunologic: Negative  Neurological: Positive for seizures and syncope  Hematological: Negative  Psychiatric/Behavioral: Negative  All other systems reviewed and are negative  Vitals:    05/09/23 1448   BP: 118/68   BP Location: Left arm   Patient Position: Sitting   Cuff Size: Standard   Pulse: 61   Weight: 91 kg (200 lb 9 6 oz)   Height: 6' 3\" (1 905 m)       Physical Exam:  Physical Exam  Constitutional:       Appearance: He is well-developed  HENT:      Head: Normocephalic and atraumatic  Eyes:      General: No scleral icterus  Right eye: No discharge  Left eye: No discharge  Pupils: Pupils are equal, round, and reactive to light  Neck:      Thyroid: No thyromegaly  Vascular: No JVD  Cardiovascular:      Rate and Rhythm: Normal rate and regular rhythm  No extrasystoles are present  Pulses: Normal pulses  No decreased pulses        Heart sounds: Normal heart sounds, S1 normal and S2 " normal  No murmur heard  No friction rub  No gallop  Pulmonary:      Effort: Pulmonary effort is normal  No respiratory distress  Breath sounds: Normal breath sounds  No wheezing, rhonchi or rales  Abdominal:      General: Bowel sounds are normal  There is no distension  Palpations: Abdomen is soft  Tenderness: There is no abdominal tenderness  Musculoskeletal:         General: No tenderness or deformity  Normal range of motion  Cervical back: Normal range of motion and neck supple  Skin:     General: Skin is warm and dry  Findings: No rash  Neurological:      Mental Status: He is alert and oriented to person, place, and time  Cranial Nerves: No cranial nerve deficit  Psychiatric:         Thought Content: Thought content normal          Judgment: Judgment normal        Counseling / Coordination of Care  Total office time spent today 40 minutes  Greater than 50% of total time was spent with the patient and / or family counseling and / or coordination of care

## 2023-05-18 ENCOUNTER — TELEPHONE (OUTPATIENT)
Dept: CARDIOLOGY CLINIC | Facility: CLINIC | Age: 38
End: 2023-05-18

## 2023-05-18 NOTE — TELEPHONE ENCOUNTER
No Authorization Required for  ([id number]) and ([id number]) per Kassidy Perrin (Benefits) on 5/18/2023 at 2:14pm   #[id number]

## 2023-05-30 ENCOUNTER — OFFICE VISIT (OUTPATIENT)
Dept: INTERNAL MEDICINE CLINIC | Facility: CLINIC | Age: 38
End: 2023-05-30

## 2023-05-30 VITALS
SYSTOLIC BLOOD PRESSURE: 120 MMHG | TEMPERATURE: 97 F | OXYGEN SATURATION: 97 % | HEIGHT: 75 IN | DIASTOLIC BLOOD PRESSURE: 70 MMHG | RESPIRATION RATE: 12 BRPM | WEIGHT: 199 LBS | BODY MASS INDEX: 24.74 KG/M2 | HEART RATE: 74 BPM

## 2023-05-30 DIAGNOSIS — F41.9 ANXIETY: ICD-10-CM

## 2023-05-30 DIAGNOSIS — G40.109 LOCALIZATION-RELATED EPILEPSY (HCC): ICD-10-CM

## 2023-05-30 DIAGNOSIS — R00.2 PALPITATIONS: ICD-10-CM

## 2023-05-30 DIAGNOSIS — F43.10 POSTTRAUMATIC STRESS DISORDER: ICD-10-CM

## 2023-05-30 DIAGNOSIS — E44.1 MILD PROTEIN-CALORIE MALNUTRITION (HCC): Primary | ICD-10-CM

## 2023-05-30 RX ORDER — MAGNESIUM GLUCONATE 27 MG(500)
500 TABLET ORAL
COMMUNITY

## 2023-05-30 RX ORDER — FAMOTIDINE 20 MG/1
20 TABLET, FILM COATED ORAL DAILY
COMMUNITY

## 2023-05-30 NOTE — PROGRESS NOTES
Assessment/Plan:    No problem-specific Assessment & Plan notes found for this encounter  Diagnoses and all orders for this visit:    Mild protein-calorie malnutrition ([address] )  Comments:  =labs reviewed no deficiencies    Localization-related epilepsy (HCC)    Posttraumatic stress disorder    Palpitations    Anxiety    Other orders  -     Cholecalciferol 25 MCG (1000 UT) tablet; Take 5,000 Units by mouth daily  -     famotidine (PEPCID) 20 mg tablet; Take 20 mg by mouth daily  -     magnesium gluconate (MAGONATE) 500 MG tablet; Take 500 mg by mouth          Subjective:      Patient ID: Parker Crews is a 40 y o  male  Since OV 2/28/23:    Cardiology ov 5/9/23:  - With changing his lifestyle which included eating better, keeping well-hydrated and cutting back caffeine a lot have Ron's symptoms have improved  He no longer has what appeared to be orthostatic hypotension, although it was never quite clear if seizures had anything to do with this as these events occurred before the diagnosis of his seizures  None of the symptoms have been been present recently  He still continues to have intermittent palpitations and random chest tightness, but can exert himself without issues  For completeness we will have him wear a 2-week extended ambulatory Holter and have him undergo an echocardiogram   We will call him with the results  If these are unremarkable he only needs to follow with us as needed  He will continue to live a healthy lifestyle   -zio monitor ordered  -echo tomorrow    Neurology 3/2023:  -In short, Mr Feli Verdin is a 40 y o  male with psoriasis, anxiety, depression and frequent events of FIAS and FAS starting in 1/2022, consistent with focal right temporal epilepsy(based on semiology, EEG (left anterior temporal sz captured, left anterior temporal slowing) and possible lesion on MRI left inferior temporal gyrus)   He has had multiple different events of varying semiologies, some of which are suggestive of PNEE or may be related to long COVID symptoms  However, he had significant improvement on trileptal (which he developed a rash on) and is also feeling better on vimpat  He may vary well carry a dual diagnosis of seizures and PNEE  Previously, we had suggested that if these were all seizures, he merited referral to EMU for differential diagnosis progressing to presurgical evaluation  However, he notes that he now only has occasional events with retained awareness and that an EMU admission would be significantly disruptive  He opts to hold off for now  Holter monitor was okay and we can continue to maximize vimpat, which he is feeling well on  Plan:   - Increase to Vimpat 250 mg twice daily  - will consider EMU for differential/presurgical evaluation if seizures progress to FAITH  - continue follow-up with therapist    - Repeat MRI in 2/2024  - Adrian Oswald as needed for seizure rescue   - [facility] program     ALLERGIST today:  Assessment/Plan:  Vesna Francis is a 40 y o  male presenting for 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  The patient developed a seizure disorder after having COVID and has been diagnosed with long COVID  He reports episodes/symptoms of severe brain fog/disorientation, syncope, diarrhea, and fatigue that have significantly improved on anti-histamines (fexofenadine and famotidine)  -will evaluate further with blood/urine testing  Check tryptase, 24 hour urine histamine, peripheral c-kit  -continue fexofenadine 180mg daily, famotidine 20mg daily   -if symptoms become uncontrolled, consider other mast cell stabilizers  Pt was asked to Return in about 4 weeks (around 6/27/2023)          The following portions of the patient's history were reviewed and updated as appropriate: allergies, current medications, past family history, past medical history, past social history, past surgical history, and problem list     Review of Systems   Constitutional: Negative for activity "change and fatigue  HENT: Negative for ear discharge, ear pain, rhinorrhea and sore throat  Eyes: Negative for pain and visual disturbance  Respiratory: Negative for cough and shortness of breath  Cardiovascular: Negative for chest pain and leg swelling  Gastrointestinal: Negative for abdominal pain, constipation and diarrhea  Endocrine: Negative for cold intolerance and polyuria  Genitourinary: Negative for flank pain and hematuria  Musculoskeletal: Negative for back pain and joint swelling  Skin: Negative for pallor and wound  Neurological: Negative for dizziness, seizures and speech difficulty  Psychiatric/Behavioral: Negative for confusion and hallucinations  Objective:      /70   Pulse 74   Temp (!) 97 °F (36 1 °C)   Resp 12   Ht 6' 3\" (1 905 m)   Wt 90 3 kg (199 lb)   SpO2 97%   BMI 24 87 kg/m²          Physical Exam  Vitals and nursing note reviewed  Constitutional:       General: He is not in acute distress  Appearance: Normal appearance  He is not ill-appearing  HENT:      Head: Normocephalic  Right Ear: External ear normal  There is no impacted cerumen  Left Ear: External ear normal  There is no impacted cerumen  Nose: No congestion or rhinorrhea  Mouth/Throat:      Pharynx: No posterior oropharyngeal erythema  Eyes:      General: No scleral icterus  Right eye: No discharge  Left eye: No discharge  Neck:      Vascular: No carotid bruit  Cardiovascular:      Rate and Rhythm: Normal rate and regular rhythm  Heart sounds: Normal heart sounds  No murmur heard  No friction rub  No gallop  Pulmonary:      Breath sounds: No wheezing or rhonchi  Abdominal:      General: There is no distension  Tenderness: There is no abdominal tenderness  There is no guarding  Musculoskeletal:         General: No swelling  Cervical back: No rigidity  Right lower leg: No edema  Left lower leg: No edema   " Lymphadenopathy:      Cervical: No cervical adenopathy  Skin:     Coloration: Skin is not jaundiced  Neurological:      Mental Status: He is alert  Cranial Nerves: No cranial nerve deficit  Motor: No weakness        Coordination: Coordination normal    Psychiatric:         Mood and Affect: Mood normal

## 2023-05-31 ENCOUNTER — TELEPHONE (OUTPATIENT)
Dept: CARDIOLOGY CLINIC | Facility: CLINIC | Age: 38
End: 2023-05-31

## 2023-05-31 NOTE — TELEPHONE ENCOUNTER
Hi, my name is Manoj Patterson  I have an appointment for 1:00 PM for a ultrasound I guess EKG  I have a [employer] heart monitor on and I wasn't sure if that would affect being able to get an ultrasound  You can reach me at [phone]  Thank you

## 2023-06-22 ENCOUNTER — CLINICAL SUPPORT (OUTPATIENT)
Dept: CARDIOLOGY CLINIC | Facility: CLINIC | Age: 38
End: 2023-06-22
Payer: COMMERCIAL

## 2023-06-22 DIAGNOSIS — I95.1 ORTHOSTATIC HYPOTENSION: ICD-10-CM

## 2023-06-22 DIAGNOSIS — R00.2 PALPITATIONS: ICD-10-CM

## 2023-06-22 PROCEDURE — 93248 EXT ECG>7D<15D REV&INTERPJ: CPT | Performed by: INTERNAL MEDICINE

## 2023-08-03 ENCOUNTER — OFFICE VISIT (OUTPATIENT)
Dept: INTERNAL MEDICINE CLINIC | Facility: CLINIC | Age: 38
End: 2023-08-03
Payer: COMMERCIAL

## 2023-08-03 VITALS
OXYGEN SATURATION: 97 % | WEIGHT: 202 LBS | TEMPERATURE: 97.8 F | HEIGHT: 75 IN | DIASTOLIC BLOOD PRESSURE: 70 MMHG | RESPIRATION RATE: 12 BRPM | BODY MASS INDEX: 25.12 KG/M2 | SYSTOLIC BLOOD PRESSURE: 120 MMHG

## 2023-08-03 DIAGNOSIS — D89.40 MAST CELL ACTIVATION SYNDROME (HCC): ICD-10-CM

## 2023-08-03 DIAGNOSIS — G40.109 LOCALIZATION-RELATED EPILEPSY (HCC): Primary | ICD-10-CM

## 2023-08-03 PROCEDURE — 99214 OFFICE O/P EST MOD 30 MIN: CPT | Performed by: INTERNAL MEDICINE

## 2023-08-03 RX ORDER — HYDROXYZINE HYDROCHLORIDE 10 MG/1
10 TABLET, FILM COATED ORAL
COMMUNITY
Start: 2023-07-18

## 2023-08-03 RX ORDER — HYDROXYZINE HYDROCHLORIDE 25 MG/1
TABLET, FILM COATED ORAL
COMMUNITY
Start: 2023-06-28

## 2023-08-03 RX ORDER — EPINEPHRINE 0.3 MG/.3ML
0.3 INJECTION SUBCUTANEOUS
COMMUNITY
Start: 2023-07-25

## 2023-08-03 NOTE — PROGRESS NOTES
Assessment/Plan:    No problem-specific Assessment & Plan notes found for this encounter. Diagnoses and all orders for this visit:    Localization-related epilepsy ([address])    Other orders  -     EPINEPHrine (EPIPEN) 0.3 mg/0.3 mL SOAJ; Inject 0.3 mg into a muscle          Subjective:      Patient ID: Arley Dill is a 40 y.o. male. 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 Symptoms  hives  2. Elevated tryptase or histamine  3. Symptoms better on antihistamine-allegra pepcid-all better    Consider repeat cbs--11/2022-wnl so unlikely marrow issue but agree with heme eval      The following portions of the patient's history were reviewed and updated as appropriate: allergies, current medications, past family history, past medical history, past social history, past surgical history, and problem list.    Review of Systems   Constitutional: Negative for activity change and fatigue. HENT: Negative for ear discharge, ear pain, rhinorrhea and sore throat. Eyes: Negative for pain and visual disturbance. Respiratory: Negative for cough and shortness of breath. Cardiovascular: Negative for chest pain and leg swelling. Gastrointestinal: Negative for abdominal pain, constipation and diarrhea. Endocrine: Negative for cold intolerance and polyuria. Genitourinary: Negative for flank pain and hematuria. Musculoskeletal: Negative for back pain and joint swelling. Skin: Negative for pallor and wound. Neurological: Negative for dizziness, seizures and speech difficulty.    Psychiatric/Behavioral: Negative for confusion, hallucinations, self-injury and suicidal ideas. Objective: There were no vitals taken for this visit. Physical Exam  Vitals and nursing note reviewed. Constitutional:       General: He is not in acute distress. Appearance: Normal appearance. He is not ill-appearing. HENT:      Head: Normocephalic. Right Ear: External ear normal. There is no impacted cerumen. Left Ear: External ear normal. There is no impacted cerumen. Nose: No congestion or rhinorrhea. Mouth/Throat:      Pharynx: No posterior oropharyngeal erythema. Eyes:      General: No scleral icterus. Right eye: No discharge. Left eye: No discharge. Neck:      Vascular: No carotid bruit. Cardiovascular:      Rate and Rhythm: Normal rate and regular rhythm. Heart sounds: Normal heart sounds. No murmur heard. No friction rub. No gallop. Pulmonary:      Breath sounds: No wheezing or rhonchi. Abdominal:      General: There is no distension. Tenderness: There is no abdominal tenderness. There is no guarding. Musculoskeletal:         General: No swelling. Cervical back: No rigidity. Right lower leg: No edema. Left lower leg: No edema. Lymphadenopathy:      Cervical: No cervical adenopathy. Skin:     Coloration: Skin is not jaundiced. Neurological:      Mental Status: He is alert. Cranial Nerves: No cranial nerve deficit. Motor: No weakness.       Coordination: Coordination normal.   Psychiatric:         Mood and Affect: Mood normal.

## 2023-08-16 LAB
BASOPHILS # BLD AUTO: 0.1 X10E3/UL (ref 0–0.2)
BASOPHILS NFR BLD AUTO: 1 %
EOSINOPHIL # BLD AUTO: 0.2 X10E3/UL (ref 0–0.4)
EOSINOPHIL NFR BLD AUTO: 4 %
ERYTHROCYTE [DISTWIDTH] IN BLOOD BY AUTOMATED COUNT: 12.2 % (ref 11.6–15.4)
HCT VFR BLD AUTO: 45 % (ref 37.5–51)
HGB BLD-MCNC: 15.2 G/DL (ref 13–17.7)
IMM GRANULOCYTES # BLD: 0 X10E3/UL (ref 0–0.1)
IMM GRANULOCYTES NFR BLD: 0 %
JAK2 P.V617F BLD/T QL: NORMAL
LAB DIRECTOR NAME PROVIDER: NORMAL
LABORATORY COMMENT REPORT: NORMAL
LYMPHOCYTES # BLD AUTO: 1.8 X10E3/UL (ref 0.7–3.1)
LYMPHOCYTES NFR BLD AUTO: 37 %
MCH RBC QN AUTO: 30.6 PG (ref 26.6–33)
MCHC RBC AUTO-ENTMCNC: 33.8 G/DL (ref 31.5–35.7)
MCV RBC AUTO: 91 FL (ref 79–97)
MONOCYTES # BLD AUTO: 0.4 X10E3/UL (ref 0.1–0.9)
MONOCYTES NFR BLD AUTO: 9 %
NEUTROPHILS # BLD AUTO: 2.4 X10E3/UL (ref 1.4–7)
NEUTROPHILS NFR BLD AUTO: 49 %
PLATELET # BLD AUTO: 215 X10E3/UL (ref 150–450)
RBC # BLD AUTO: 4.96 X10E6/UL (ref 4.14–5.8)
WBC # BLD AUTO: 4.8 X10E3/UL (ref 3.4–10.8)

## 2023-08-23 LAB
JAK2 P.V617F BLD/T QL: NORMAL
LAB DIRECTOR NAME PROVIDER: NORMAL
LABORATORY COMMENT REPORT: NORMAL

## 2023-11-27 PROBLEM — D47.09: Status: ACTIVE | Noted: 2023-10-17

## 2024-07-01 ENCOUNTER — TELEPHONE (OUTPATIENT)
Dept: INTERNAL MEDICINE CLINIC | Facility: CLINIC | Age: 39
End: 2024-07-01
